# Patient Record
Sex: FEMALE | Race: WHITE | NOT HISPANIC OR LATINO | URBAN - METROPOLITAN AREA
[De-identification: names, ages, dates, MRNs, and addresses within clinical notes are randomized per-mention and may not be internally consistent; named-entity substitution may affect disease eponyms.]

---

## 2024-05-06 ENCOUNTER — OFFICE VISIT (OUTPATIENT)
Dept: OBSTETRICS AND GYNECOLOGY | Facility: CLINIC | Age: 39
End: 2024-05-06
Payer: COMMERCIAL

## 2024-05-06 VITALS
WEIGHT: 113.38 LBS | DIASTOLIC BLOOD PRESSURE: 60 MMHG | BODY MASS INDEX: 20.86 KG/M2 | HEIGHT: 62 IN | SYSTOLIC BLOOD PRESSURE: 114 MMHG

## 2024-05-06 DIAGNOSIS — Z01.419 WELL WOMAN EXAM: Primary | ICD-10-CM

## 2024-05-06 DIAGNOSIS — Z12.31 BREAST CANCER SCREENING BY MAMMOGRAM: ICD-10-CM

## 2024-05-06 DIAGNOSIS — Z30.430 ENCOUNTER FOR INSERTION OF MIRENA IUD: ICD-10-CM

## 2024-05-06 PROBLEM — R00.2 INTERMITTENT PALPITATIONS: Status: ACTIVE | Noted: 2024-05-06

## 2024-05-06 PROBLEM — R20.2 PARESTHESIAS: Status: ACTIVE | Noted: 2024-05-06

## 2024-05-06 PROBLEM — Z86.2 HISTORY OF ANEMIA: Status: ACTIVE | Noted: 2024-05-06

## 2024-05-06 LAB — PREGNANCY TEST URINE, POC: NEGATIVE

## 2024-05-06 PROCEDURE — 87624 HPV HI-RISK TYP POOLED RSLT: CPT

## 2024-05-06 PROCEDURE — 81025 URINE PREGNANCY TEST: CPT | Performed by: ADVANCED PRACTICE MIDWIFE

## 2024-05-06 PROCEDURE — 58300 INSERT INTRAUTERINE DEVICE: CPT | Performed by: ADVANCED PRACTICE MIDWIFE

## 2024-05-06 PROCEDURE — 88175 CYTOPATH C/V AUTO FLUID REDO: CPT

## 2024-05-06 PROCEDURE — 1036F TOBACCO NON-USER: CPT | Performed by: ADVANCED PRACTICE MIDWIFE

## 2024-05-06 PROCEDURE — 99385 PREV VISIT NEW AGE 18-39: CPT | Performed by: ADVANCED PRACTICE MIDWIFE

## 2024-05-06 RX ORDER — IBUPROFEN 100 MG/1
TABLET, CHEWABLE ORAL EVERY 8 HOURS PRN
COMMUNITY

## 2024-05-06 RX ORDER — CETIRIZINE HYDROCHLORIDE 10 MG/1
10 TABLET ORAL DAILY
COMMUNITY

## 2024-05-06 NOTE — PROGRESS NOTES
Patient ID: Anel Mireles is a 38 y.o. female.    IUD Insertion    Date/Time: 5/6/2024 4:03 PM    Performed by: OSEI Muller  Authorized by: OSEI Muller    Consent:     Consent obtained:  Verbal and written    Consent given by:  Patient    Procedure risks and benefits discussed: yes      Patient questions answered: yes      Patient agrees, verbalizes understanding, and wants to proceed: yes      Educational handouts given: yes      Instructions and paperwork completed: yes    Procedure:     Pelvic exam performed: yes      Negative urine pregnancy test: yes      Cervix cleaned and prepped: yes      Speculum placed in vagina: yes      Tenaculum applied to cervix: yes      Uterus sounded: yes      Uterus sound depth (cm):  8    IUD inserted with no complications: yes      IUD type:  Mirena    Strings trimmed: yes    Post-procedure:     Patient tolerated procedure well: yes      Patient will follow up after next period: yes      IUD Insertion Procedure Note    Indications: contraception    Procedure Details   Urine pregnancy test was done prior to the IUD insertion and result was neg .  The risks (including infection, bleeding, pain, and uterine perforation) and benefits of the procedure were explained to the patient and Written informed consent was obtained.      Procedure: Mirena (IUD) placement  Cervix cleansed with Betadine. Uterus sounded to 8 cm.   Local anesthesia:  None  Tenaculum used:   Allis , anterior  IUD inserted without difficulty.   String visible and trimmed to 3cm.  Patient tolerated procedure well.    Condition:  Stable      Complications:  None      Plan:  The patient was advised to call for any fever or for prolonged or severe pain or bleeding. She was advised to use NSAID as needed for mild to moderate pain.   Return to care in 2-3 months for an IUD string check    OSEI Muller

## 2024-05-06 NOTE — PROGRESS NOTES
"Subjective   Anel Mireles is a 38 y.o. female who is here for a routine well woman exam.     Concerns today:  IUD insertion  HPV vaccine    Currently going through a divorce, never received HPV vaccine as she aged out of recommendations and believed herself to be low risk being in a monogamous relationship.   Now that she has the potential for different sexual partners she would like to discuss getting the vaccine and she needs something more reliable for birth control as she finds she is inconsistent with daily use of the pill.    Patient's last menstrual period was 2024.   Periods are regular every 28-30 days    Sexual Activity: sexually active, male partners; Patient reports 1 partners in the last 12 months.    History of prior STI: abnormal Pap    Current contraception: condoms    Last pap:   History of abnormal Pap smear: yes -   Treatment for cervical dysplasia: yes -   HPV vaccine?: Pt interested in receiving, will check with insurance company to make sure it will be covered.     Family history of breast cancer or ovarian cancer: no    Menstrual History:  OB History          3    Para   2    Term                AB        Living   2         SAB        IAB        Ectopic        Multiple        Live Births   2                Patient's last menstrual period was 2024.         Objective   /60   Ht 1.575 m (5' 2\")   Wt 51.4 kg (113 lb 6 oz)   LMP 2024   BMI 20.74 kg/m²     Physical Exam  Constitutional:       Appearance: Normal appearance.   HENT:      Head: Normocephalic.      Nose: Nose normal.      Mouth/Throat:      Mouth: Mucous membranes are moist.      Pharynx: Oropharynx is clear.   Eyes:      Pupils: Pupils are equal, round, and reactive to light.   Cardiovascular:      Rate and Rhythm: Normal rate and regular rhythm.   Pulmonary:      Effort: Pulmonary effort is normal.      Breath sounds: Normal breath sounds.   Chest:   Breasts:     Right: " Normal. No mass, nipple discharge, skin change or tenderness.      Left: Normal. No mass, nipple discharge, skin change or tenderness.   Abdominal:      General: Abdomen is flat.      Palpations: Abdomen is soft.   Genitourinary:     General: Normal vulva.      Vagina: Normal.      Cervix: Normal.      Uterus: Normal.    Musculoskeletal:         General: Normal range of motion.      Cervical back: Normal range of motion and neck supple.   Skin:     General: Skin is warm and dry.   Neurological:      Mental Status: She is alert.   Psychiatric:         Mood and Affect: Mood normal.         Behavior: Behavior normal.        Assessment/Plan   Normal well woman exam  Continue healthy lifestyle habits  Consider taking a multivitamin daily  Continue recommended women's health screenings  Pap collected, if normal, repeat in 3 yrs  Mammogram ordered, pt to schedule  IUD insertion  RBA of the Mirena IUD discussed, consent obtained  IUD placed easily (see procedure note)  Advised Ibuprofen for cramping  Return to care in 2-3 months for a string check    HINA Muller-TARA

## 2024-07-08 ENCOUNTER — APPOINTMENT (OUTPATIENT)
Dept: OBSTETRICS AND GYNECOLOGY | Facility: CLINIC | Age: 39
End: 2024-07-08
Payer: COMMERCIAL

## 2024-07-08 VITALS
BODY MASS INDEX: 20.98 KG/M2 | DIASTOLIC BLOOD PRESSURE: 60 MMHG | HEIGHT: 62 IN | WEIGHT: 114 LBS | SYSTOLIC BLOOD PRESSURE: 110 MMHG

## 2024-07-08 DIAGNOSIS — Z30.431 IUD CHECK UP: Primary | ICD-10-CM

## 2024-07-08 DIAGNOSIS — S31.41XS: ICD-10-CM

## 2024-07-08 PROCEDURE — 1036F TOBACCO NON-USER: CPT | Performed by: ADVANCED PRACTICE MIDWIFE

## 2024-07-08 PROCEDURE — 99213 OFFICE O/P EST LOW 20 MIN: CPT | Performed by: ADVANCED PRACTICE MIDWIFE

## 2024-07-08 RX ORDER — LEVONORGESTREL 52 MG/1
1 INTRAUTERINE DEVICE INTRAUTERINE ONCE
COMMUNITY

## 2024-07-08 NOTE — PROGRESS NOTES
Anel Mireles is a 38 y.o. who presents for a IUD Check       IUD placed: 5/6/2024    Bleeding pattern since placement: No bleeding post insertion for a few weeks, then bled for 45 days straight, nothing for the last 2 weeks.    Physical Exam  Constitutional:       Appearance: Normal appearance.   Genitourinary:      Genitourinary Comments: Left inner labia torn (likely with childbirth), healed now but left labia is lower than right side, hangs lower which is bothersome to patient.             IUD strings visualized.   HENT:      Head: Normocephalic and atraumatic.   Pulmonary:      Effort: Pulmonary effort is normal.   Musculoskeletal:         General: Normal range of motion.   Neurological:      General: No focal deficit present.      Mental Status: She is alert and oriented to person, place, and time.   Psychiatric:         Mood and Affect: Mood normal.         Behavior: Behavior normal.   Vitals reviewed.         Assessment/Plan    IUD string check  Pt is very satisfied with this method of contraception.  Labial defect  Left labia minora hangs lower than right side r/t healed OB laceration   Referral to plastic surgery for consideration of a labiaplasty        RTC for annual exam and as needed

## 2024-11-26 ENCOUNTER — APPOINTMENT (OUTPATIENT)
Dept: OBSTETRICS AND GYNECOLOGY | Facility: CLINIC | Age: 39
End: 2024-11-26
Payer: COMMERCIAL

## 2024-11-26 DIAGNOSIS — F32.A DEPRESSION, UNSPECIFIED DEPRESSION TYPE: Primary | ICD-10-CM

## 2024-11-26 PROCEDURE — 99212 OFFICE O/P EST SF 10 MIN: CPT | Performed by: OBSTETRICS & GYNECOLOGY

## 2024-11-26 PROCEDURE — 1036F TOBACCO NON-USER: CPT | Performed by: OBSTETRICS & GYNECOLOGY

## 2024-11-26 RX ORDER — SERTRALINE HYDROCHLORIDE 50 MG/1
50 TABLET, FILM COATED ORAL DAILY
Qty: 90 TABLET | Refills: 3 | Status: SHIPPED | OUTPATIENT
Start: 2024-11-26 | End: 2024-12-26

## 2024-11-26 ASSESSMENT — ENCOUNTER SYMPTOMS
GASTROINTESTINAL NEGATIVE: 1
DEPRESSION: 1
CONSTITUTIONAL NEGATIVE: 1
EYES NEGATIVE: 1
CARDIOVASCULAR NEGATIVE: 1
RESPIRATORY NEGATIVE: 1

## 2024-11-26 NOTE — PROGRESS NOTES
Subjective   Patient ID: Anel Mireles is a 38 y.o. female who presents for Depression.    Depression    37 y/o  presenting for virtual visit for depression. Seeing counselor.  Khadra a lot, short fuse, not enjoying things she normally enjoys.      Review of Systems   Constitutional: Negative.    HENT: Negative.     Eyes: Negative.    Respiratory: Negative.     Cardiovascular: Negative.    Gastrointestinal: Negative.    Psychiatric/Behavioral:  Positive for depression.      Objective   Physical Exam  Gen in NAD    Assessment/Plan   37 y/o  presenting for discussion of depression.  Discussion regarding starting SSRI.  Patient desires to start.  Will start zoloft 50 mg daily, follow up in 4-6 wks to reassess for improvement.     Michelle Nova MD 24 2:00 PM

## 2025-01-03 ENCOUNTER — APPOINTMENT (OUTPATIENT)
Dept: OBSTETRICS AND GYNECOLOGY | Facility: CLINIC | Age: 40
End: 2025-01-03
Payer: COMMERCIAL

## 2025-01-28 ENCOUNTER — APPOINTMENT (OUTPATIENT)
Dept: OBSTETRICS AND GYNECOLOGY | Facility: CLINIC | Age: 40
End: 2025-01-28
Payer: COMMERCIAL

## 2025-01-28 DIAGNOSIS — F32.A DEPRESSION, UNSPECIFIED DEPRESSION TYPE: Primary | ICD-10-CM

## 2025-01-28 ASSESSMENT — ENCOUNTER SYMPTOMS
CARDIOVASCULAR NEGATIVE: 1
GASTROINTESTINAL NEGATIVE: 1
RESPIRATORY NEGATIVE: 1
CONSTITUTIONAL NEGATIVE: 1
DEPRESSION: 1
EYES NEGATIVE: 1

## 2025-01-28 NOTE — PROGRESS NOTES
Subjective   Patient ID: Anel Mireles is a 39 y.o. female who presents for Follow-up.    Depression    39 y/o  presenting for telephone encounter to follow up after starting zoloft 50 mg.  Feeling better, thinkjs this is a good dose for her currently.     Review of Systems   Constitutional: Negative.    HENT: Negative.     Eyes: Negative.    Respiratory: Negative.     Cardiovascular: Negative.    Gastrointestinal: Negative.        Assessment/Plan   39 y/o  presenting for telephone follow up after starting zoloft 50 mg for depression/anxiety.  Feeling much improved, wants to continue this dose for now.  RTC for annual exam or any other concerns.    Michelle Nova MD 25 4:34 PM

## 2025-02-15 ENCOUNTER — OFFICE VISIT (OUTPATIENT)
Dept: URGENT CARE | Age: 40
End: 2025-02-15
Payer: COMMERCIAL

## 2025-02-15 VITALS
SYSTOLIC BLOOD PRESSURE: 94 MMHG | WEIGHT: 114 LBS | HEIGHT: 62 IN | DIASTOLIC BLOOD PRESSURE: 62 MMHG | RESPIRATION RATE: 18 BRPM | HEART RATE: 71 BPM | OXYGEN SATURATION: 98 % | TEMPERATURE: 98 F | BODY MASS INDEX: 20.98 KG/M2

## 2025-02-15 DIAGNOSIS — J02.9 SORE THROAT: ICD-10-CM

## 2025-02-15 DIAGNOSIS — J02.0 STREP THROAT: Primary | ICD-10-CM

## 2025-02-15 LAB — POC RAPID STREP: POSITIVE

## 2025-02-15 RX ORDER — AMOXICILLIN 500 MG/1
500 CAPSULE ORAL 2 TIMES DAILY
Qty: 20 CAPSULE | Refills: 0 | Status: SHIPPED | OUTPATIENT
Start: 2025-02-15 | End: 2025-02-25

## 2025-02-15 ASSESSMENT — PATIENT HEALTH QUESTIONNAIRE - PHQ9
2. FEELING DOWN, DEPRESSED OR HOPELESS: NOT AT ALL
SUM OF ALL RESPONSES TO PHQ9 QUESTIONS 1 & 2: 0
1. LITTLE INTEREST OR PLEASURE IN DOING THINGS: NOT AT ALL

## 2025-02-15 ASSESSMENT — ENCOUNTER SYMPTOMS: SORE THROAT: 1

## 2025-02-15 NOTE — PROGRESS NOTES
"Subjective   Patient ID: Anel Mireles is a 39 y.o. female. They present today with a chief complaint of Generalized Body Aches and Sore Throat (Started 24-48 hours /White patches on back of throat /).    History of Present Illness  Pt present to the  today with the c/o of ST, no difficulty swallowing. No fever at this time or other associated symptoms.       Sore Throat         Past Medical History  Allergies as of 02/15/2025 - Reviewed 02/15/2025   Allergen Reaction Noted    Cinnamon Itching and Swelling 2024       (Not in a hospital admission)       Past Medical History:   Diagnosis Date    Encounter for full-term uncomplicated delivery     Spontaneous vaginal delivery    Personal history of other complications of pregnancy, childbirth and the puerperium 2018    History of spontaneous        Past Surgical History:   Procedure Laterality Date    OTHER SURGICAL HISTORY  2018    Loop electrosurgical excision procedure    OTHER SURGICAL HISTORY  2018    [place] tooth extraction        reports that she has never smoked. She has never been exposed to tobacco smoke. She has never used smokeless tobacco. She reports current alcohol use. She reports that she does not use drugs.    Review of Systems  Review of Systems   HENT:  Positive for sore throat.      10 point ROS completed and all are negative other than what is stated in the current HPI                               Objective    Vitals:    02/15/25 1050   BP: 94/62   BP Location: Left arm   Patient Position: Sitting   Pulse: 71   Resp: 18   Temp: 36.7 °C (98 °F)   SpO2: 98%   Weight: 51.7 kg (114 lb)   Height: 1.575 m (5' 2\")     No LMP recorded. Patient has had an implant.    Physical Exam  Vitals and nursing note reviewed.   Constitutional:       Appearance: Normal appearance.   HENT:      Mouth/Throat:      Mouth: Mucous membranes are moist.      Pharynx: Oropharyngeal exudate and posterior oropharyngeal erythema present. "   Cardiovascular:      Rate and Rhythm: Normal rate and regular rhythm.   Pulmonary:      Effort: Pulmonary effort is normal.      Breath sounds: Normal breath sounds.   Abdominal:      Palpations: Abdomen is soft.      Tenderness: There is no abdominal tenderness.   Musculoskeletal:      Cervical back: No tenderness.   Lymphadenopathy:      Cervical: Cervical adenopathy present.   Skin:     General: Skin is warm and dry.   Neurological:      Mental Status: She is alert and oriented to person, place, and time.         Procedures    Point of Care Test & Imaging Results from this visit  Results for orders placed or performed in visit on 02/15/25   POCT rapid strep A manually resulted   Result Value Ref Range    POC Rapid Strep Positive (A) Negative      No results found.    Diagnostic study results (if any) were reviewed by LISA Pope.    Assessment/Plan   Allergies, medications, history, and pertinent labs/EKGs/Imaging reviewed by LISA Pope.     Medical Decision Making  Strep Throat:  - No difficulty swallowing  - Rapid Strep positive, antibiotics started;  throw out toothbrush in the next 4 days to prevent re-infection  - Infectious with antibiotics for up to 48 hours after starting  - Good oral hydration to prevent dehydration  - Warm salt gargles; Cepacol drops/spray OTC  - Advised on s/s to seek emergent care for  - Tylenol/Motrin as needed for pain or fever    Orders and Diagnoses  Diagnoses and all orders for this visit:  Strep throat  -     amoxicillin (Amoxil) 500 mg capsule; Take 1 capsule (500 mg) by mouth 2 times a day for 10 days.  Sore throat  -     POCT rapid strep A manually resulted      Medical Admin Record      Patient disposition: Home    Electronically signed by LISA Pope  11:25 AM

## 2025-02-15 NOTE — PATIENT INSTRUCTIONS
Strep Throat:  - No difficulty swallowing  - Rapid Strep positive, antibiotics started;  throw out toothbrush in the next 4 days to prevent re-infection  - Infectious with antibiotics for up to 48 hours after starting  - Good oral hydration to prevent dehydration  - Warm salt gargles; Cepacol drops/spray OTC  - Advised on s/s to seek emergent care for  - Tylenol/Motrin as needed for pain or fever

## 2025-02-28 ENCOUNTER — OFFICE VISIT (OUTPATIENT)
Dept: URGENT CARE | Age: 40
End: 2025-02-28
Payer: COMMERCIAL

## 2025-02-28 VITALS
HEART RATE: 100 BPM | TEMPERATURE: 97.8 F | RESPIRATION RATE: 16 BRPM | DIASTOLIC BLOOD PRESSURE: 71 MMHG | SYSTOLIC BLOOD PRESSURE: 109 MMHG | OXYGEN SATURATION: 98 %

## 2025-02-28 DIAGNOSIS — J03.90 ACUTE TONSILLITIS, UNSPECIFIED ETIOLOGY: Primary | ICD-10-CM

## 2025-02-28 DIAGNOSIS — J02.9 SORE THROAT: ICD-10-CM

## 2025-02-28 LAB — POC RAPID STREP: NEGATIVE

## 2025-02-28 RX ORDER — AMOXICILLIN AND CLAVULANATE POTASSIUM 875; 125 MG/1; MG/1
875 TABLET, FILM COATED ORAL 2 TIMES DAILY
Qty: 20 TABLET | Refills: 0 | Status: SHIPPED | OUTPATIENT
Start: 2025-02-28

## 2025-02-28 ASSESSMENT — ENCOUNTER SYMPTOMS
RESPIRATORY NEGATIVE: 1
WHEEZING: 0
VOMITING: 0
NAUSEA: 0
CONSTITUTIONAL NEGATIVE: 1
CARDIOVASCULAR NEGATIVE: 1
SHORTNESS OF BREATH: 0
GASTROINTESTINAL NEGATIVE: 1
NEUROLOGICAL NEGATIVE: 1
COUGH: 0
SORE THROAT: 1
ABDOMINAL PAIN: 0
DIARRHEA: 0

## 2025-02-28 NOTE — PROGRESS NOTES
Subjective   Patient ID: Anel Mireles is a 39 y.o. female. They present today with a chief complaint of Sore Throat.    History of Present Illness  Subjective  Anel Mireles is a 39 y.o. female who presents for evaluation of sore throat. Associated symptoms include post nasal drip, sore throat, and swollen glands. Onset of symptoms was 10 days ago, and have been improved initially then worsening since that time. She is drinking moderate amounts of fluids. She has had a recent close exposure to someone with proven streptococcal pharyngitis.    Laboratory  Strep test done. Results:negative.  PCR sent    Assessment/Plan  Acute pharyngitis, likely tonsillitis.    Patient placed on antibiotics.  Use of OTC analgesics recommended as well as salt water gargles.  Use of decongestant recommended.  Follow up as needed.        History provided by:  Patient and medical records  Sore Throat   Pertinent negatives include no abdominal pain, coughing, diarrhea, shortness of breath or vomiting.       Past Medical History  Allergies as of 2025 - Reviewed 2025   Allergen Reaction Noted    Cinnamon Itching and Swelling 2024       (Not in a hospital admission)       Past Medical History:   Diagnosis Date    Encounter for full-term uncomplicated delivery     Spontaneous vaginal delivery    Personal history of other complications of pregnancy, childbirth and the puerperium 2018    History of spontaneous        Past Surgical History:   Procedure Laterality Date    OTHER SURGICAL HISTORY  2018    Loop electrosurgical excision procedure    OTHER SURGICAL HISTORY  2018    [place] tooth extraction        reports that she has never smoked. She has never been exposed to tobacco smoke. She has never used smokeless tobacco. She reports current alcohol use. She reports that she does not use drugs.    Review of Systems  Review of Systems   Constitutional: Negative.    HENT:  Positive for postnasal  drip and sore throat.    Respiratory: Negative.  Negative for cough, shortness of breath and wheezing.    Cardiovascular: Negative.  Negative for chest pain.   Gastrointestinal: Negative.  Negative for abdominal pain, diarrhea, nausea and vomiting.   Neurological: Negative.    All other systems reviewed and are negative.                                 Objective    Vitals:    02/28/25 0928   BP: 109/71   Pulse: 100   Resp: 16   Temp: 36.6 °C (97.8 °F)   SpO2: 98%     No LMP recorded. Patient has had an implant.    Physical Exam  Vitals and nursing note reviewed.   Constitutional:       General: She is not in acute distress.     Appearance: She is ill-appearing.   HENT:      Head: Atraumatic.      Right Ear: A middle ear effusion is present.      Left Ear: A middle ear effusion is present.      Nose: Mucosal edema present.      Right Turbinates: Not swollen.      Left Turbinates: Not swollen.      Mouth/Throat:      Lips: Pink.      Mouth: Mucous membranes are moist.      Pharynx: Uvula midline. Posterior oropharyngeal erythema and postnasal drip present.      Tonsils: Tonsillar exudate present. 1+ on the right. 1+ on the left.   Cardiovascular:      Rate and Rhythm: Normal rate and regular rhythm.      Pulses: Normal pulses.      Heart sounds: Normal heart sounds.   Pulmonary:      Effort: Pulmonary effort is normal.      Breath sounds: Normal breath sounds.   Abdominal:      General: Abdomen is flat. Bowel sounds are normal.      Palpations: Abdomen is soft.      Tenderness: There is no abdominal tenderness.   Skin:     General: Skin is warm and dry.      Capillary Refill: Capillary refill takes less than 2 seconds.   Neurological:      Mental Status: She is alert and oriented to person, place, and time.   Psychiatric:         Behavior: Behavior normal.         Procedures    Point of Care Test & Imaging Results from this visit  Results for orders placed or performed in visit on 02/28/25   POCT rapid strep A  manually resulted   Result Value Ref Range    POC Rapid Strep Negative Negative      No results found.    Diagnostic study results (if any) were reviewed by LISA Perez.    Assessment/Plan   Allergies, medications, history, and pertinent labs/EKGs/Imaging reviewed by LISA Perez.     Medical Decision Making  Risks, benefits, and alternatives of the medications and treatment plan prescribed today were discussed, and patient expressed understanding. Plan follow up as discussed or as needed if any worsening symptoms or change in condition. Reinforced red flags including (but not limited to): severe or worsening pain; difficulty swallowing; stiff neck; shortness of breath; coughing or vomiting blood; chest pain; and new or increased fever are indications to go to the Emergency Department.  At time of discharge patient was clinically well-appearing and HDS for outpatient management. The patient and/or family was educated regarding diagnosis, supportive care, OTC and Rx medications. The patient and/or family was given the opportunity to ask questions prior to discharge.  They verbalized understanding of my discussion of the plans for treatment, expected course, indications to return to  or seek further evaluation in ED, and the need for timely follow up as directed.   They were provided with a work/school excuse if requested. The after-visit summary was given to the patient and care instructions were reviewed with the patient. All questions were answered and the patient verbalized understanding of the plan of care for today.  Plan:  Recent visit notes reviewed  Meds as above  Increase clear fluids  Pcp follow up this week if not improving or worsening  ER visit anytime 24/7 for acute worsening or changing condition      Orders and Diagnoses  Diagnoses and all orders for this visit:  Acute tonsillitis, unspecified etiology  -     amoxicillin-pot clavulanate (Augmentin) 875-125 mg tablet;  Take 1 tablet (875 mg) by mouth 2 times a day.  -     Group A Streptococcus, PCR  Sore throat  -     POCT rapid strep A manually resulted      Medical Admin Record      Patient disposition: Home    Electronically signed by LISA Perez  10:14 AM

## 2025-03-01 LAB — S PYO DNA THROAT QL NAA+PROBE: DETECTED

## 2025-03-05 DIAGNOSIS — F41.9 ANXIETY: Primary | ICD-10-CM

## 2025-03-05 RX ORDER — SERTRALINE HYDROCHLORIDE 50 MG/1
75 TABLET, FILM COATED ORAL DAILY
Qty: 135 TABLET | Refills: 3 | Status: SHIPPED | OUTPATIENT
Start: 2025-03-05 | End: 2025-06-03